# Patient Record
Sex: FEMALE | Race: BLACK OR AFRICAN AMERICAN | Employment: UNEMPLOYED | URBAN - METROPOLITAN AREA
[De-identification: names, ages, dates, MRNs, and addresses within clinical notes are randomized per-mention and may not be internally consistent; named-entity substitution may affect disease eponyms.]

---

## 2018-05-18 ENCOUNTER — TELEPHONE (OUTPATIENT)
Dept: FAMILY MEDICINE CLINIC | Age: 43
End: 2018-05-18

## 2018-05-18 ENCOUNTER — OFFICE VISIT (OUTPATIENT)
Dept: FAMILY MEDICINE CLINIC | Age: 43
End: 2018-05-18

## 2018-05-18 ENCOUNTER — HOSPITAL ENCOUNTER (OUTPATIENT)
Dept: LAB | Age: 43
Discharge: HOME | End: 2018-05-18

## 2018-05-18 VITALS
BODY MASS INDEX: 39.75 KG/M2 | SYSTOLIC BLOOD PRESSURE: 126 MMHG | TEMPERATURE: 98 F | HEART RATE: 78 BPM | OXYGEN SATURATION: 98 % | RESPIRATION RATE: 18 BRPM | HEIGHT: 62 IN | DIASTOLIC BLOOD PRESSURE: 75 MMHG | WEIGHT: 216 LBS

## 2018-05-18 DIAGNOSIS — F33.2 MDD (MAJOR DEPRESSIVE DISORDER), RECURRENT SEVERE, WITHOUT PSYCHOSIS (HCC): ICD-10-CM

## 2018-05-18 DIAGNOSIS — R06.83 SNORING: ICD-10-CM

## 2018-05-18 DIAGNOSIS — Z01.419 WELL WOMAN EXAM: ICD-10-CM

## 2018-05-18 DIAGNOSIS — I10 ESSENTIAL HYPERTENSION: ICD-10-CM

## 2018-05-18 DIAGNOSIS — R53.83 FATIGUE, UNSPECIFIED TYPE: ICD-10-CM

## 2018-05-18 DIAGNOSIS — E66.9 OBESITY (BMI 30.0-34.9): ICD-10-CM

## 2018-05-18 DIAGNOSIS — M94.0 COSTOCHONDRITIS: ICD-10-CM

## 2018-05-18 DIAGNOSIS — I10 ESSENTIAL HYPERTENSION: Primary | ICD-10-CM

## 2018-05-18 DIAGNOSIS — D64.9 ANEMIA, UNSPECIFIED TYPE: ICD-10-CM

## 2018-05-18 PROCEDURE — 99001 SPECIMEN HANDLING PT-LAB: CPT | Performed by: INTERNAL MEDICINE

## 2018-05-18 RX ORDER — BUPROPION HYDROCHLORIDE 100 MG/1
100 TABLET, EXTENDED RELEASE ORAL 2 TIMES DAILY
Qty: 60 TAB | Refills: 1 | Status: SHIPPED | OUTPATIENT
Start: 2018-05-18 | End: 2018-07-17

## 2018-05-18 RX ORDER — BUPROPION HYDROCHLORIDE 100 MG/1
100 TABLET, EXTENDED RELEASE ORAL 2 TIMES DAILY
Qty: 60 TAB | Refills: 1 | Status: SHIPPED | OUTPATIENT
Start: 2018-05-18 | End: 2018-05-18 | Stop reason: SDUPTHER

## 2018-05-18 RX ORDER — AMLODIPINE BESYLATE 5 MG/1
5 TABLET ORAL DAILY
Qty: 30 TAB | Refills: 5 | Status: SHIPPED | OUTPATIENT
Start: 2018-05-18 | End: 2018-05-18 | Stop reason: SDUPTHER

## 2018-05-18 RX ORDER — IBUPROFEN 600 MG/1
600 TABLET ORAL
Qty: 90 TAB | Refills: 1 | Status: SHIPPED | OUTPATIENT
Start: 2018-05-18 | End: 2018-07-17

## 2018-05-18 RX ORDER — AMLODIPINE BESYLATE 5 MG/1
5 TABLET ORAL DAILY
Qty: 30 TAB | Refills: 5 | Status: SHIPPED | OUTPATIENT
Start: 2018-05-18 | End: 2018-11-14

## 2018-05-18 RX ORDER — IBUPROFEN 600 MG/1
600 TABLET ORAL
Qty: 90 TAB | Refills: 1 | Status: SHIPPED | OUTPATIENT
Start: 2018-05-18 | End: 2018-05-18 | Stop reason: SDUPTHER

## 2018-05-18 NOTE — MR AVS SNAPSHOT
[address] [place] [address] [address] [address] 
[phone] Patient: Angella Delgado MRN: [id number] :1975 Visit Information Date & Time Provider Department Dept. Phone Encounter #  
 2018  2:00 PM Kaity Hanley MD [address] [phone] [id number] Follow-up Instructions Return in about 6 weeks (around 2018) for depression, weight loss. Upcoming Health Maintenance Date Due Pneumococcal 19-64 Medium Risk (1 of 1 - PPSV23) 10/31/1996 DTaP/Tdap/Td series (1 - Tdap) 10/31/1998 Influenza Age 5 to Adult 2018 PAP AKA CERVICAL CYTOLOGY 2020 Allergies as of 2018  Review Complete On: 2018 By: Kaity Hanley MD  
 No Known Allergies Current Immunizations  Never Reviewed No immunizations on file. Not reviewed this visit You Were Diagnosed With   
  
 Codes Comments Essential hypertension    -  Primary ICD-10-CM: I10 
ICD-9-CM: 401.9 Obesity (BMI 30.0-34.9)     ICD-10-CM: B98.7 ICD-9-CM: 278.00   
 MDD (major depressive disorder), recurrent severe, without psychosis [facility]     ICD-10-CM: F33.2 ICD-9-CM: 296.33 Costochondritis     ICD-10-CM: M94.0 ICD-9-CM: 733.6 Anemia, unspecified type     ICD-10-CM: D64.9 ICD-9-CM: 285.9 Snoring     ICD-10-CM: R06.83 
ICD-9-CM: 786.09 Fatigue, unspecified type     ICD-10-CM: R53.83 ICD-9-CM: 780.79 Well woman exam     ICD-10-CM: B14.775 ICD-9-CM: V72.31 Vitals BP Pulse Temp Resp Height(growth percentile) Weight(growth percentile) 126/75 78 98 °F (36.7 °C) 18 5' 2\" (1.575 m) 216 lb (98 kg) SpO2 BMI OB Status Smoking Status 98% 39.51 kg/m2 Having regular periods Current Every Day Smoker BMI and BSA Data Body Mass Index Body Surface Area  
 39.51 kg/m 2 2.07 m 2 Preferred Pharmacy Pharmacy Name Phone [facility] [address] [facility], [address] [place] [address] [phone] Your Updated Medication List  
  
   
This list is accurate as of 5/18/18  2:45 PM.  Always use your most recent med list. amLODIPine 5 mg tablet Commonly known as:  Philippe Lute Take 1 Tab by mouth daily for 180 days. buPROPion  mg SR tablet Commonly known as:  Bunny Thang Take 1 Tab by mouth two (2) times a day for 60 days. ibuprofen 600 mg tablet Commonly known as:  MOTRIN Take 1 Tab by mouth every eight (8) hours as needed for Pain for up to 60 days. Prescriptions Sent to Pharmacy Refills  
 amLODIPine (NORVASC) 5 mg tablet 5 Sig: Take 1 Tab by mouth daily for 180 days. Class: Normal  
 Pharmacy: [facility] [address] [facility], [address] [place] [address] Ph #: [phone] Route: Oral  
 ibuprofen (MOTRIN) 600 mg tablet 1 Sig: Take 1 Tab by mouth every eight (8) hours as needed for Pain for up to 60 days. Class: Normal  
 Pharmacy: [facility] [address], [address] [place] [address] Ph #: [phone] Route: Oral  
 buPROPion SR (WELLBUTRIN SR) 100 mg SR tablet 1 Sig: Take 1 Tab by mouth two (2) times a day for 60 days. Class: Normal  
 Pharmacy: [facility] [address], [address] [place] [address] Ph #: [phone] Route: Oral  
  
We Performed the Following REFERRAL TO GYNECOLOGY [REF30 Custom] Comments:  
 Please evaluate patient for pap, well woman exam 
 
[facility]. SLEEP MEDICINE REFERRAL [GFC624 Custom] Comments:  
 Please evaluate patient for sleep apnea with sleep testing Aternity. Follow-up Instructions Return in about 6 weeks (around 6/29/2018) for depression, weight loss. To-Do List   
 05/18/2018 Lab:  CBC WITH AUTOMATED DIFF   
  
 05/18/2018 Lab:  FERRITIN   
  
 05/18/2018 Lab:  IRON PROFILE   
  
 05/18/2018 Lab:  METABOLIC PANEL, COMPREHENSIVE   
  
 05/18/2018 Lab:  T4, FREE   
  
 05/18/2018 Lab:  TSH 3RD GENERATION Referral Information Referral ID Referred By Referred To  
  
 [id number] Brigitte Cheeks Not Available Visits Status Start Date End Date 1 New Request 5/18/18 5/18/19 If your referral has a status of pending review or denied, additional information will be sent to support the outcome of this decision. Referral ID Referred By Referred To  
 [id number] Brigitte Cheeks Not Available Visits Status Start Date End Date 1 New Request 5/18/18 5/18/19 If your referral has a status of pending review or denied, additional information will be sent to support the outcome of this decision. Patient Instructions We are sending a referral to gynecology for well woman exam/pap smear and sleep medicine for evaluation of sleep apnea . You should be called about this in 3-4 weeks. If no word in 4 weeks please give us a call to follow up For Chest Pain: 
Take ibuprofen 600 mg with food 3x per day for 2 weeks then take as needed For Depression/Anxiety: 
Take wellbutrin 100 mg twice a day, last dose at 2 PM 
If increased anxiety or trouble eating call clinic to discuss For Weight Loss: 
Eat low carbohydrate diet outlined below Work on walking 30 minutes a day 4-5 days a week Costochondritis: Care Instructions Your Care Instructions You have chest pain because the cartilage of your rib cage is inflamed. This problem is called costochondritis. This type of chest wall pain may last from days to weeks. It is not a heart problem. Sometimes costochondritis occurs with a cold or the flu, and other times the exact cause is not known. Follow-up care is a key part of your treatment and safety.  Be sure to make and go to all appointments, and call your doctor if you are having problems. It's also a good idea to know your test results and keep a list of the medicines you take. How can you care for yourself at home? · Take medicines for pain and inflammation exactly as directed. ¨ If the doctor gave you a prescription medicine, take it as prescribed. ¨ If you are not taking a prescription pain medicine, ask your doctor if you can take an over-the-counter medicine. ¨ Do not take two or more pain medicines at the same time unless the doctor told you to. Many pain medicines have acetaminophen, which is Tylenol. Too much acetaminophen (Tylenol) can be harmful. · It may help to use a warm compress or heating pad (set on low) on your chest. You can also try alternating heat and ice. Put ice or a cold pack on the area for 10 to 20 minutes at a time. Put a thin cloth between the ice and your skin. · Avoid any activity that strains the chest area. As your pain gets better, you can slowly return to your normal activities. · Do not use tape, an elastic bandage, a \"rib belt,\" or anything else that restricts your chest wall motion. When should you call for help? Call 911 anytime you think you may need emergency care. For example, call if: 
? · You have new or different chest pain or pressure. This may occur with: ¨ Sweating. ¨ Shortness of breath. ¨ Nausea or vomiting. ¨ Pain that spreads from the chest to the neck, jaw, or one or both shoulders or arms. ¨ Dizziness or lightheadedness. ¨ A fast or uneven pulse. After calling 911, chew 1 adult-strength aspirin. Wait for an ambulance. Do not try to drive yourself. ? · You have severe trouble breathing. ?Call your doctor now or seek immediate medical care if: 
? · You have a fever or cough. ? · You have any trouble breathing. ? · Your chest pain gets worse. ? Watch closely for changes in your health, and be sure to contact your doctor if: ? · Your chest pain continues even though you are taking anti-inflammatory medicine. ? · Your chest wall pain has not improved after 5 to 7 days. Where can you learn more? Go to http://opal-parker.info/. Enter G722 in the search box to learn more about \"Costochondritis: Care Instructions. \" Current as of: March 20, 2017 Content Version: 11.4 © 0224-6907 [employer]. Care instructions adapted under license by [employer] (which disclaims liability or warranty for this information). If you have questions about a medical condition or this instruction, always ask your healthcare professional. [place] [address] any warranty or liability for your use of this information. Learning About Low-Carbohydrate Diets for Weight Loss What is a low-carbohydrate diet? Low-carb diets avoid foods that are high in carbohydrate. These high-carb foods include pasta, bread, rice, cereal, fruits, and starchy vegetables. Instead, these diets usually have you eat foods that are high in fat and protein. Many people lose weight quickly on a low-carb diet. But the early weight loss is water. People on this diet often gain the weight back after they start eating carbs again. Not all diet plans are safe or work well. A lot of the evidence shows that low-carb diets aren't healthy. That's because these diets often don't include healthy foods like fruits and vegetables. Losing weight safely means balancing protein, fat, and carbs with every meal and snack. And low-carb diets don't always provide the vitamins, minerals, and fiber you need. If you have a serious medical condition, talk to your doctor before you try any diet. These conditions include kidney disease, heart disease, type 2 diabetes, high cholesterol, and high blood pressure. If you are pregnant, it may not be safe for your baby if you are on a low-carb diet. How can you lose weight safely? You might have heard that a diet plan helped another person lose weight. But that doesn't mean that it will work for you. It is very hard to stay on a diet that includes lots of big changes in your eating habits. If you want to get to a healthy weight and stay there, making healthy lifestyle changes will often work better than dieting. These steps can help. · Make a plan for change. Work with your doctor to create a plan that is right for you. · See a dietitian. He or she can show you how to make healthy changes in your eating habits. · Manage stress. If you have a lot of stress in your life, it can be hard to focus on making healthy changes to your daily habits. · Track your food and activity. You are likely to do better at losing weight if you keep track of what you eat and what you do. Follow-up care is a key part of your treatment and safety. Be sure to make and go to all appointments, and call your doctor if you are having problems. It's also a good idea to know your test results and keep a list of the medicines you take. Where can you learn more? Go to http://opal-parker.info/. Enter A121 in the search box to learn more about \"Learning About Low-Carbohydrate Diets for Weight Loss. \" Current as of: May 12, 2017 Content Version: 11.4 © 4591-9059 [employer]. Care instructions adapted under license by [employer] (which disclaims liability or warranty for this information). If you have questions about a medical condition or this instruction, always ask your healthcare professional. [place] [address] any warranty or liability for your use of this information. Bupropion (By mouth) Bupropion ([id number]) Treats depression and aids in quitting smoking. Also prevents depression caused by seasonal affective disorder (SAD). Brand Name(s): Aplenzin, Forfivo XL, Wellbutrin, Wellbutrin SR, Wellbutrin XL, Zyban There may be other brand names for this medicine. When This Medicine Should Not Be Used: This medicine is not right for everyone. Do not use it if you had an allergic reaction to bupropion, or if you have seizures, anorexia, or bulimia. How to Use This Medicine:  
Tablet, Long Acting Tablet · Take your medicine as directed. Your dose may need to be changed several times to find what works best for you. · You may need to take Wellbutrin® for up to 4 weeks before you feel better. You may need to take Zyban® for 1 to 2 weeks before the date that you plan to stop smoking. · Swallow the extended-release tablet whole. Do not crush, break, or chew it. · It is best to take Aplenzin® in the morning. · Do not take Wellbutrin® or Zyban® close to bedtime if you have trouble sleeping. · Take it with food if it upsets your stomach or if you have nausea. · If you take the extended-release tablet, part of the tablet may pass into your stools. This is normal and is nothing to worry about. · This medicine should come with a Medication Guide. Ask your pharmacist for a copy if you do not have one. · Missed dose: Skip the missed dose and go back to your regular dosing schedule. Never take extra medicine to make up for a missed dose. · Store the medicine in a closed container at room temperature, away from heat, moisture, and direct light. Drugs and Foods to Avoid: Ask your doctor or pharmacist before using any other medicine, including over-the-counter medicines, vitamins, and herbal products. · Do not use this medicine and an MAO inhibitor (MAOI) within 14 days of each other. Do not use Zyban® to quit smoking if you already take Aplenzin® or Wellbutrin® for depression, because they are the same medicine. · Tell your doctor if you take barbiturates, benzodiazepines, antiseizure medicine, or sedatives, or if you recently stopped taking them. · Some medicines can affect how bupropion works.  Tell your doctor if you use any of the following: ¨ Amantadine, carbamazepine, cimetidine, clopidogrel, cyclophosphamide, digoxin, efavirenz, levodopa, lopinavir, nelfinavir, nicotine patch, orphenadrine, phenobarbital, phenytoin, ritonavir, tamoxifen, theophylline, thiotepa, ticlopidine ¨ Beta blocker medicine (including metoprolol) ¨ A blood thinner (including warfarin) ¨ Insulin or diabetes medicine ¨ Medicine to treat depression (including desipramine, fluoxetine, imipramine, nortriptyline, paroxetine, sertraline, venlafaxine) ¨ Medicine to treat heart rhythm problems (including flecainide, propafenone) ¨ Medicine to treat mental illness (including haloperidol, risperidone, thioridazine) ¨ Steroid medicine (including dexamethasone, hydrocortisone, methylprednisolone, prednisolone, prednisone) · Do not drink alcohol while you are using this medicine. · Tell your doctor if you use anything else that makes you sleepy. Some examples are allergy medicine, narcotic pain medicine, and alcohol. Warnings While Using This Medicine: · Tell your doctor if you are pregnant or breastfeeding, or if you have kidney disease, liver disease, heart disease, diabetes, glaucoma, mental illness (including bipolar disorder), or high blood pressure. Tell your doctor if you have a history of drug addiction or if you drink alcohol. · For some children, teenagers, and young adults, this medicine may increase mental or emotional problems. This may lead to thoughts of suicide and violence. Talk with your doctor right away if you have any thoughts or behavior changes that concern you. Tell your doctor if you or anyone in your family has a history of bipolar disorder or suicide attempts. · This medicine may cause the following problems: 
¨ Increased risk of seizures ¨ Changes in mood or behavior ¨ High blood pressure ¨ Serious skin reactions · This medicine may make you dizzy or drowsy.  Do not drive or do anything that could be dangerous until you know how this medicine affects you. · Zyban® is only part of a complete program to help you quit smoking. You may still want to smoke at times. Have a plan to cope with these situations. · Do not stop using this medicine suddenly. Your doctor will need to slowly decrease your dose before you stop it completely. · Tell any doctor or dentist who treats you that you are using this medicine. This medicine may affect certain medical test results. · Your doctor will check your progress and the effects of this medicine at regular visits. Keep all appointments. · Keep all medicine out of the reach of children. Never share your medicine with anyone. Possible Side Effects While Using This Medicine:  
Call your doctor right away if you notice any of these side effects: · Allergic reaction: Itching or hives, swelling in your face or hands, swelling or tingling in your mouth or throat, chest tightness, trouble breathing · Blistering, peeling, red skin rash · Chest pain, trouble breathing, fast, slow, or uneven heartbeat · Eye pain, vision changes, seeing halos around lights · Muscle or joint pain, fever with rash · Seeing or hearing things that are not there, feeling like people are against you · Seizures · Sudden increase in energy, racing thoughts, trouble sleeping · Thoughts of hurting yourself, worsening depression, severe agitation or confusion If you notice these less serious side effects, talk with your doctor: · Dry mouth 
· Headache, dizziness · Nausea, vomiting, constipation, diarrhea, gas, stomach pain · Weight gain or loss If you notice other side effects that you think are caused by this medicine, tell your doctor. Call your doctor for medical advice about side effects. You may report side effects to FDA at 0-116-FDA-7213 © 2017 [address] Information is for End User's use only and may not be sold, redistributed or otherwise used for commercial purposes. The above information is an  only. It is not intended as medical advice for individual conditions or treatments. Talk to your doctor, nurse or pharmacist before following any medical regimen to see if it is safe and effective for you. Introducing [facility] SERVICES! Ciara Guzman introduces BeneStream patient portal. Now you can access parts of your medical record, email your doctor's office, and request medication refills online. 1. In your internet browser, go to [email or web address] 2. Click on the First Time User? Click Here link in the Sign In box. You will see the New Member Sign Up page. 3. Enter your BeneStream Access Code exactly as it appears below. You will not need to use this code after youve completed the sign-up process. If you do not sign up before the expiration date, you must request a new code. · BeneStream Access Code: [email or web address] Expires: 7/9/2018  8:05 PM 
 
4. Enter the last four digits of your Social Security Number (xxxx) and Date of Birth (mm/dd/yyyy) as indicated and click Submit. You will be taken to the next sign-up page. 5. Create a BeneStream ID. This will be your BeneStream login ID and cannot be changed, so think of one that is secure and easy to remember. 6. Create a BeneStream password. You can change your password at any time. 7. Enter your Password Reset Question and Answer. This can be used at a later time if you forget your password. 8. Enter your e-mail address. You will receive e-mail notification when new information is available in [address] 9. Click Sign Up. You can now view and download portions of your medical record. 10. Click the Download Summary menu link to download a portable copy of your medical information.  
 
If you have questions, please visit the Frequently Asked Questions section of the [employer]. Remember, [email or web address] is NOT to be used for urgent needs. For medical emergencies, dial 911. Now available from your iPhone and Android! Please provide this summary of care documentation to your next provider. Your primary care clinician is listed as NONE. If you have any questions after today's visit, please call [phone].

## 2018-05-18 NOTE — PROGRESS NOTES
INTERNAL MEDICINE INITIAL PROVIDER VISIT    SUBJECTIVE:     Chief Complaint   Patient presents with    New Patient    Hypertension       HPI: 38 y.o. female with PMHx significant for HTN and obesity is here for the above chief complaint(s). Here to establish care. Chest pain: onset 2 months ago while resting. Sharp throbbing pain, sometimes goes away. Nothing makes better. Taking deep breaths to help with pain but not helping. No associated shortness of breath. No pleuritic chest pain. No associated symptoms. Once felt dizzy and blurry vision last week and at same time had high blood pressure. Seen for this pain 4/10/2018 and r/o for ischemia of heart via negative troponins x 3 and negative cardiac stress test. Feels heart racing. Hypertension: Today BP is controlled. Taking amlodipine 5, started 4/11/2018. No side effects from medications. Medication fair compliance did not take for 2 days and then took this AM. Denies headache, lightheadedness, dizziness, vision changes, irregular heart rate, shortness of breath, cough, abdominal pain. Some right leg tightness but not swelling. Insomnia: Some AM headaches, rare caffiene intake. STOP-BANG: + snoring, + day time fatigue, - observied apneic episodes, + HTN, - BMI >35, - AGE >50, - neck size >17 in male >15 in female, - male gender. 3/8 intermediate risk ASHER. Depression and Anxiety: Onset years ago, over time \"gradually gets worse. \" Feels worry is worse than sadness. No past treatment with medication or therapy. No h/o elevated energy with decreased need for sleep. No impulsivity. Stressors include kids, currently on probation. For past 2 weeks reports nearly daily worrying too much, irritable mood, feeling afraid, trouble sleeping, low energy, over eating, feeling down depressed and more than half days of anhedonia, feeling bad about self, fidgeting, trouble feeling nervous on edge and not being able to control worry.  No suicidal thoughts, no suicide attempts. No AVH/PD. Rare alcohol use and no drugs. ROS: 12 point ROS completed, positive per HPI    Current Outpatient Prescriptions   Medication Sig    amLODIPine (NORVASC) 5 mg tablet Take 1 Tab by mouth daily for 180 days.  buPROPion SR (WELLBUTRIN SR) 100 mg SR tablet Take 1 Tab by mouth two (2) times a day for 60 days.  ibuprofen (MOTRIN) 600 mg tablet Take 1 Tab by mouth every eight (8) hours as needed for Pain for up to 60 days. No current facility-administered medications for this visit. Health Maintenance   Topic Date Due    Pneumococcal 19-64 Medium Risk (1 of 1 - PPSV23) 10/31/1996    DTaP/Tdap/Td series (1 - Tdap) 10/31/1998    Influenza Age 5 to Adult  08/01/2018    PAP AKA CERVICAL CYTOLOGY  01/01/2020       Medications and Allergies: Reviewed and confirmed in the chart    Past Medical Hx: Reviewed and confirmed in the chart  Patient Active Problem List   Diagnosis Code    Obesity (BMI 30.0-34. 9) E66.9    Hypertension I10    Costochondritis M94.0    Anemia D64.9    Fatigue R53.83    Snoring R06.83       Family Hx, Surgical Hx, Social Hx: Reviewed and updated in EMR    OBJECTIVE:  Vitals:    05/18/18 1417   BP: 126/75   Pulse: 78   Resp: 18   Temp: 98 °F (36.7 °C)   SpO2: 98%   Weight: 216 lb (98 kg)   Height: 5' 2\" (1.575 m)       BP Readings from Last 3 Encounters:   05/18/18 126/75   04/11/18 (!) 143/104   12/02/17 (!) 158/119     Wt Readings from Last 3 Encounters:   05/18/18 216 lb (98 kg)   04/10/18 190 lb (86.2 kg)   12/02/17 191 lb (86.6 kg)       General: Pleasant adult woman in no acute distress  HEENT: Head is atraumatic normo-cephalic. Neck: Supple, no LAD, no palpable thyromegaly. CVS: Heart regular, rate, and rhythm. Audible S1 and S2. No murmurs, rubs or gallops. Chest: TTP anteior chest wall, reproducible chest pain  PULM: Lungs clear to auscultation bilaterally. No wheezes, rales or rhonchi. EXT: 2+ dorsalis pedis pulses bilaterally.  No pedal edema bilaterally  Neuro: Alert and oriented x3. MSE: mood euthymic with underlying dysphoria, affect congruent and reactive, No SI/HI. PHQ-9: 19, somewhat difficult  FRANKO-7: 15, somewhat difficult    RESULTS:  4/10/2018  9:30 PM - Uri[place], [facility]   [employer] Value Flag Ref Range Units Status   Sodium 139  136 - 145 mEq/L Final   Potassium 3.5  3.5 - 5.1 mEq/L Final   Chloride 107  98 - 107 mEq/L Final   CO2 24  21 - 32 mEq/L Final   Glucose 87  74 - 106 mg/dl Final   BUN 11  7 - 25 mg/dl Final   Creatinine 0.9  0.6 - 1.3 mg/dl Final   GFR est AA >60.0     Final   Comment:   GFR est non-AA >60     Final   Calcium 8.9  8.5 - 10.1 mg/dl Final     4/10/2018  9:04 PM - Uriah, [facility]   [employer] Value Flag Ref Range Units Status   WBC 8.5  4.0 - 11.0 1000/mm3 Final   RBC 5.59 (H) 3.60 - 5.20 M/uL Final   HGB 12.2 (L) 13.0 - 17.2 gm/dl Final   HCT 38.6  37.0 - 50.0 % Final   MCV 69.1 (L) 80.0 - 98.0 fL Final   MCH 21.8 (L) 25.4 - 34.6 pg Final   MCHC 31.6  30.0 - 36.0 gm/dl Final   PLATELET 563  390 - 901 1000/mm3 Final   MPV 9.4  6.0 - 10.0 fL Final   RDW-SD 39.8  36.4 - 46.3   Final   NRBC 0  0 - 0   Final   IMMATURE GRANULOCYTES 0.2  0.0 - 3.0 % Final         Echo Results  (Last 48 hours)                    18 1003   ECHO STRESS Final result     Narrative:   ==================== XCELERA REPORT ========================================   [facility] ID: [id number]                                                     [facility]                                                     [address] [place] [address].  [place], [address] [place] [address], [address]                                Stress Echocardiogram Report        Name: [facility] Date: 0     2018 10:02 AM   MRN: [id number]                  Patient Location: [id number]^[id number]   : 1975              Age: 38 yrs   Height: 62 in                Weight: 190 lb                       BSA: 1.9 m2   BP: 152/97 mmHg              HR: 81   Gender: Female               Account #: [de-identified]   Reason For Study: Chest Pain   History: Obese, HTN   Ordering Physician: Sharad Carter   Performed By: Yaakov Hidalgo, [facility]        Interpretation Summary   The Electrocardiographic Interpretation: negative by ECG criteria. The Echocardiographic Interpretation: hyperkinetic wall motion. The OverAll Impression : negative for ischemia with low likelihood for CAD.      _____________________________________________________________________________   Colonel Couch   Interpretation Summary        Stress Results              Protocol:  Simone              Target HR: 153 bpm        Maximum Predicted HR: 180 bpm                            Stress Duration:  6:21 mm:ss *                      Maximum Stress HR:  169 bpm *             Stress Comments   A treadmill exercise test according to Simone protocol was performed. The   baseline ECG displays normal sinus rhythm. Arrhythmia induced during stress:   occasional PAC's. There no ST segment changes during stress. Test was   terminated due to target heart rate was achieved. Patient developed no   symptoms. Normal blood pressure response.        I      WMSI = 1.00     % Normal = 100                                                                   REST               Nursing Notes Reviewed    ASSESSMENT AND PLAN    ICD-10-CM ICD-9-CM    1. Essential hypertension I10 401.9 TSH 3RD GENERATION      METABOLIC PANEL, COMPREHENSIVE      T4, FREE      SLEEP MEDICINE REFERRAL      TSH 3RD GENERATION      METABOLIC PANEL, COMPREHENSIVE      T4, FREE       amLODIPine (NORVASC) 5 mg tablet   2. Obesity (BMI 30.0-34. 9) E66.9 278.00 SLEEP MEDICINE REFERRAL  Diet and exercise discussed   3.  MDD (major depressive disorder), recurrent severe, without psychosis ([facility] [address]): low risk SI, see HPI assessment F33.2 296.33 TSH 3RD GENERATION      T4, FREE      SLEEP MEDICINE REFERRAL      TSH 3RD GENERATION      T4, FREE       buPROPion SR (WELLBUTRIN SR) 100 mg SR tablet BID discussed r/b/se of med proposed   4. Costochondritis M94.0 733.6  ibuprofen (MOTRIN) 600 mg tablet   5. Anemia, unspecified type D64.9 285.9 CBC WITH AUTOMATED DIFF      IRON PROFILE      FERRITIN      CBC WITH AUTOMATED DIFF      IRON PROFILE      FERRITIN   6. Snoring R06.83 786.09 SLEEP MEDICINE REFERRAL   7. Fatigue, unspecified type R53.83 780.79 SLEEP MEDICINE REFERRAL   8. Well woman exam Z01.419 V72.31 REFERRAL TO GYNECOLOGY       Orders Placed This Encounter    TSH 3RD GENERATION    METABOLIC PANEL, COMPREHENSIVE    CBC WITH AUTOMATED DIFF    IRON PROFILE    FERRITIN    T4, FREE    SLEEP MEDICINE REFERRAL    REFERRAL TO GYNECOLOGY    DISCONTD: amLODIPine (NORVASC) 5 mg tablet    DISCONTD: ibuprofen (MOTRIN) 600 mg tablet    DISCONTD: buPROPion SR (WELLBUTRIN SR) 100 mg SR tablet       Future Appointments  Date Time Provider Syl Johnsoni   6/29/2018 10:00 AM Osmin Fournier MD [address] [place] [address] printed and provided to patient    Assessment and plan above discussed with patient, patient voiced understanding and agreement with plan. More than 50% of this 45 min visit was spent face to face counseling the patient about the etiology and treatment options for the above health conditions outlined in assessment and plan       Osmin Fournier M.D.   [place] [phone]  [place] [phone]

## 2018-05-18 NOTE — PROGRESS NOTES
Chief Complaint   Patient presents with    New Patient    Hypertension     1. Have you been to the ER, urgent care clinic since your last visit? Hospitalized since your last visit? [facility]- HTN    2. Have you seen or consulted any other health care providers outside of the [address] [place] [address] since your last visit? Include any pap smears or colon screening.  No

## 2018-05-18 NOTE — TELEPHONE ENCOUNTER
Pt states currently at pharmacy [facility]. Pharmacy has been updated.  Contacted nurse Raquel Rodriguez and stated will resend medication to correct pharmacy

## 2018-05-18 NOTE — PATIENT INSTRUCTIONS
We are sending a referral to gynecology for well woman exam/pap smear and sleep medicine for evaluation of sleep apnea . You should be called about this in 3-4 weeks. If no word in 4 weeks please give us a call to follow up    For Chest Pain:  Take ibuprofen 600 mg with food 3x per day for 2 weeks then take as needed    For Depression/Anxiety:  Take wellbutrin 100 mg twice a day, last dose at 2 PM  If increased anxiety or trouble eating call clinic to discuss    For Weight Loss:  Eat low carbohydrate diet outlined below  Work on walking 30 minutes a day 4-5 days a week            Costochondritis: Care Instructions  Your Care Instructions  You have chest pain because the cartilage of your rib cage is inflamed. This problem is called costochondritis. This type of chest wall pain may last from days to weeks. It is not a heart problem. Sometimes costochondritis occurs with a cold or the flu, and other times the exact cause is not known. Follow-up care is a key part of your treatment and safety. Be sure to make and go to all appointments, and call your doctor if you are having problems. It's also a good idea to know your test results and keep a list of the medicines you take. How can you care for yourself at home? · Take medicines for pain and inflammation exactly as directed. ¨ If the doctor gave you a prescription medicine, take it as prescribed. ¨ If you are not taking a prescription pain medicine, ask your doctor if you can take an over-the-counter medicine. ¨ Do not take two or more pain medicines at the same time unless the doctor told you to. Many pain medicines have acetaminophen, which is Tylenol. Too much acetaminophen (Tylenol) can be harmful. · It may help to use a warm compress or heating pad (set on low) on your chest. You can also try alternating heat and ice. Put ice or a cold pack on the area for 10 to 20 minutes at a time. Put a thin cloth between the ice and your skin.   · Avoid any activity that strains the chest area. As your pain gets better, you can slowly return to your normal activities. · Do not use tape, an elastic bandage, a \"rib belt,\" or anything else that restricts your chest wall motion. When should you call for help? Call 911 anytime you think you may need emergency care. For example, call if:  ? · You have new or different chest pain or pressure. This may occur with:  ¨ Sweating. ¨ Shortness of breath. ¨ Nausea or vomiting. ¨ Pain that spreads from the chest to the neck, jaw, or one or both shoulders or arms. ¨ Dizziness or lightheadedness. ¨ A fast or uneven pulse. After calling 911, chew 1 adult-strength aspirin. Wait for an ambulance. Do not try to drive yourself. ? · You have severe trouble breathing. ?Call your doctor now or seek immediate medical care if:  ? · You have a fever or cough. ? · You have any trouble breathing. ? · Your chest pain gets worse. ? Watch closely for changes in your health, and be sure to contact your doctor if:  ? · Your chest pain continues even though you are taking anti-inflammatory medicine. ? · Your chest wall pain has not improved after 5 to 7 days. Where can you learn more? Go to http://opal-parker.info/. Enter S033 in the search box to learn more about \"Costochondritis: Care Instructions. \"  Current as of: March 20, 2017  Content Version: 11.4  © 9708-1468 [employer]. Care instructions adapted under license by [facility] (which disclaims liability or warranty for this information). If you have questions about a medical condition or this instruction, always ask your healthcare professional. [place] [address] any warranty or liability for your use of this information. Learning About Low-Carbohydrate Diets for Weight Loss  What is a low-carbohydrate diet? Low-carb diets avoid foods that are high in carbohydrate.  These high-carb foods include pasta, bread, rice, cereal, fruits, and starchy vegetables. Instead, these diets usually have you eat foods that are high in fat and protein. Many people lose weight quickly on a low-carb diet. But the early weight loss is water. People on this diet often gain the weight back after they start eating carbs again. Not all diet plans are safe or work well. A lot of the evidence shows that low-carb diets aren't healthy. That's because these diets often don't include healthy foods like fruits and vegetables. Losing weight safely means balancing protein, fat, and carbs with every meal and snack. And low-carb diets don't always provide the vitamins, minerals, and fiber you need. If you have a serious medical condition, talk to your doctor before you try any diet. These conditions include kidney disease, heart disease, type 2 diabetes, high cholesterol, and high blood pressure. If you are pregnant, it may not be safe for your baby if you are on a low-carb diet. How can you lose weight safely? You might have heard that a diet plan helped another person lose weight. But that doesn't mean that it will work for you. It is very hard to stay on a diet that includes lots of big changes in your eating habits. If you want to get to a healthy weight and stay there, making healthy lifestyle changes will often work better than dieting. These steps can help. · Make a plan for change. Work with your doctor to create a plan that is right for you. · See a dietitian. He or she can show you how to make healthy changes in your eating habits. · Manage stress. If you have a lot of stress in your life, it can be hard to focus on making healthy changes to your daily habits. · Track your food and activity. You are likely to do better at losing weight if you keep track of what you eat and what you do. Follow-up care is a key part of your treatment and safety. Be sure to make and go to all appointments, and call your doctor if you are having problems.  It's also a good idea to know your test results and keep a list of the medicines you take. Where can you learn more? Go to http://opal-parker.info/. Enter A121 in the search box to learn more about \"Learning About Low-Carbohydrate Diets for Weight Loss. \"  Current as of: May 12, 2017  Content Version: 11.4  © 7082-8628 [employer]. Care instructions adapted under license by [employer] (which disclaims liability or warranty for this information). If you have questions about a medical condition or this instruction, always ask your healthcare professional. [address] any warranty or liability for your use of this information. Bupropion (By mouth)   Bupropion ([id number]-on)  Treats depression and aids in quitting smoking. Also prevents depression caused by seasonal affective disorder (SAD). Brand Name(s): Aplenzin, Forfivo XL, Wellbutrin, Wellbutrin SR, Wellbutrin XL, Zyban   There may be other brand names for this medicine. When This Medicine Should Not Be Used: This medicine is not right for everyone. Do not use it if you had an allergic reaction to bupropion, or if you have seizures, anorexia, or bulimia. How to Use This Medicine:   Tablet, Long Acting Tablet  · Take your medicine as directed. Your dose may need to be changed several times to find what works best for you. · You may need to take Wellbutrin® for up to 4 weeks before you feel better. You may need to take Zyban® for 1 to 2 weeks before the date that you plan to stop smoking. · Swallow the extended-release tablet whole. Do not crush, break, or chew it. · It is best to take Aplenzin® in the morning. · Do not take Wellbutrin® or Zyban® close to bedtime if you have trouble sleeping. · Take it with food if it upsets your stomach or if you have nausea. · If you take the extended-release tablet, part of the tablet may pass into your stools.  This is normal and is nothing to worry about.  · This medicine should come with a Medication Guide. Ask your pharmacist for a copy if you do not have one. · Missed dose: Skip the missed dose and go back to your regular dosing schedule. Never take extra medicine to make up for a missed dose. · Store the medicine in a closed container at room temperature, away from heat, moisture, and direct light. Drugs and Foods to Avoid:   Ask your doctor or pharmacist before using any other medicine, including over-the-counter medicines, vitamins, and herbal products. · Do not use this medicine and an MAO inhibitor (MAOI) within 14 days of each other. Do not use Zyban® to quit smoking if you already take Aplenzin® or Wellbutrin® for depression, because they are the same medicine. · Tell your doctor if you take barbiturates, benzodiazepines, antiseizure medicine, or sedatives, or if you recently stopped taking them. · Some medicines can affect how bupropion works. Tell your doctor if you use any of the following:   ¨ Amantadine, carbamazepine, cimetidine, clopidogrel, cyclophosphamide, digoxin, efavirenz, levodopa, lopinavir, nelfinavir, nicotine patch, orphenadrine, phenobarbital, phenytoin, ritonavir, tamoxifen, theophylline, thiotepa, ticlopidine  ¨ Beta blocker medicine (including metoprolol)  ¨ A blood thinner (including warfarin)  ¨ Insulin or diabetes medicine  ¨ Medicine to treat depression (including desipramine, fluoxetine, imipramine, nortriptyline, paroxetine, sertraline, venlafaxine)  ¨ Medicine to treat heart rhythm problems (including flecainide, propafenone)  ¨ Medicine to treat mental illness (including haloperidol, risperidone, thioridazine)  ¨ Steroid medicine (including dexamethasone, hydrocortisone, methylprednisolone, prednisolone, prednisone)  · Do not drink alcohol while you are using this medicine. · Tell your doctor if you use anything else that makes you sleepy.  Some examples are allergy medicine, narcotic pain medicine, and alcohol. Warnings While Using This Medicine:   · Tell your doctor if you are pregnant or breastfeeding, or if you have kidney disease, liver disease, heart disease, diabetes, glaucoma, mental illness (including bipolar disorder), or high blood pressure. Tell your doctor if you have a history of drug addiction or if you drink alcohol. · For some children, teenagers, and young adults, this medicine may increase mental or emotional problems. This may lead to thoughts of suicide and violence. Talk with your doctor right away if you have any thoughts or behavior changes that concern you. Tell your doctor if you or anyone in your family has a history of bipolar disorder or suicide attempts. · This medicine may cause the following problems:  ¨ Increased risk of seizures  ¨ Changes in mood or behavior  ¨ High blood pressure  ¨ Serious skin reactions  · This medicine may make you dizzy or drowsy. Do not drive or do anything that could be dangerous until you know how this medicine affects you. · Zyban® is only part of a complete program to help you quit smoking. You may still want to smoke at times. Have a plan to cope with these situations. · Do not stop using this medicine suddenly. Your doctor will need to slowly decrease your dose before you stop it completely. · Tell any doctor or dentist who treats you that you are using this medicine. This medicine may affect certain medical test results. · Your doctor will check your progress and the effects of this medicine at regular visits. Keep all appointments. · Keep all medicine out of the reach of children. Never share your medicine with anyone.   Possible Side Effects While Using This Medicine:   Call your doctor right away if you notice any of these side effects:  · Allergic reaction: Itching or hives, swelling in your face or hands, swelling or tingling in your mouth or throat, chest tightness, trouble breathing  · Blistering, peeling, red skin rash  · Chest pain, trouble breathing, fast, slow, or uneven heartbeat  · Eye pain, vision changes, seeing halos around lights  · Muscle or joint pain, fever with rash  · Seeing or hearing things that are not there, feeling like people are against you  · Seizures  · Sudden increase in energy, racing thoughts, trouble sleeping  · Thoughts of hurting yourself, worsening depression, severe agitation or confusion  If you notice these less serious side effects, talk with your doctor:   · Dry mouth  · Headache, dizziness  · Nausea, vomiting, constipation, diarrhea, gas, stomach pain  · Weight gain or loss  If you notice other side effects that you think are caused by this medicine, tell your doctor. Call your doctor for medical advice about side effects. You may report side effects to FDA at 7-823-BLM-4570  © 2017 [facility] Information is for End User's use only and may not be sold, redistributed or otherwise used for commercial purposes. The above information is an  only. It is not intended as medical advice for individual conditions or treatments. Talk to your doctor, nurse or pharmacist before following any medical regimen to see if it is safe and effective for you.

## 2018-05-19 LAB
ALBUMIN SERPL-MCNC: 4.1 G/DL (ref 3.5–5.5)
ALBUMIN/GLOB SERPL: 1.1 {RATIO} (ref 1.2–2.2)
ALP SERPL-CCNC: 74 IU/L (ref 39–117)
ALT SERPL-CCNC: 27 IU/L (ref 0–32)
AST SERPL-CCNC: 23 IU/L (ref 0–40)
BASOPHILS # BLD AUTO: 0 X10E3/UL (ref 0–0.2)
BASOPHILS NFR BLD AUTO: 0 %
BILIRUB SERPL-MCNC: 0.3 MG/DL (ref 0–1.2)
BUN SERPL-MCNC: 11 MG/DL (ref 6–24)
BUN/CREAT SERPL: 12 (ref 9–23)
CALCIUM SERPL-MCNC: 9.3 MG/DL (ref 8.7–10.2)
CHLORIDE SERPL-SCNC: 101 MMOL/L (ref 96–106)
CO2 SERPL-SCNC: 23 MMOL/L (ref 18–29)
CREAT SERPL-MCNC: 0.91 MG/DL (ref 0.57–1)
EOSINOPHIL # BLD AUTO: 0.3 X10E3/UL (ref 0–0.4)
EOSINOPHIL NFR BLD AUTO: 3 %
ERYTHROCYTE [DISTWIDTH] IN BLOOD BY AUTOMATED COUNT: 16.6 % (ref 12.3–15.4)
FERRITIN SERPL-MCNC: 57 NG/ML (ref 15–150)
GFR SERPLBLD CREATININE-BSD FMLA CKD-EPI: 79 ML/MIN/1.73
GFR SERPLBLD CREATININE-BSD FMLA CKD-EPI: 91 ML/MIN/1.73
GLOBULIN SER CALC-MCNC: 3.6 G/DL (ref 1.5–4.5)
GLUCOSE SERPL-MCNC: 115 MG/DL (ref 65–99)
HCT VFR BLD AUTO: 41.4 % (ref 34–46.6)
HGB BLD-MCNC: 12.7 G/DL (ref 11.1–15.9)
IMM GRANULOCYTES # BLD: 0 X10E3/UL (ref 0–0.1)
IMM GRANULOCYTES NFR BLD: 0 %
IRON SATN MFR SERPL: 14 % (ref 15–55)
IRON SERPL-MCNC: 39 UG/DL (ref 27–159)
LYMPHOCYTES # BLD AUTO: 3.7 X10E3/UL (ref 0.7–3.1)
LYMPHOCYTES NFR BLD AUTO: 39 %
MCH RBC QN AUTO: 21.8 PG (ref 26.6–33)
MCHC RBC AUTO-ENTMCNC: 30.7 G/DL (ref 31.5–35.7)
MCV RBC AUTO: 71 FL (ref 79–97)
MONOCYTES # BLD AUTO: 0.8 X10E3/UL (ref 0.1–0.9)
MONOCYTES NFR BLD AUTO: 8 %
NEUTROPHILS # BLD AUTO: 4.7 X10E3/UL (ref 1.4–7)
NEUTROPHILS NFR BLD AUTO: 50 %
PLATELET # BLD AUTO: 386 X10E3/UL (ref 150–379)
POTASSIUM SERPL-SCNC: 3.7 MMOL/L (ref 3.5–5.2)
PROT SERPL-MCNC: 7.7 G/DL (ref 6–8.5)
RBC # BLD AUTO: 5.83 X10E6/UL (ref 3.77–5.28)
SODIUM SERPL-SCNC: 140 MMOL/L (ref 134–144)
T4 FREE SERPL-MCNC: 1.4 NG/DL (ref 0.82–1.77)
TIBC SERPL-MCNC: 277 UG/DL (ref 250–450)
TSH SERPL DL<=0.005 MIU/L-ACNC: 0.53 UIU/ML (ref 0.45–4.5)
UIBC SERPL-MCNC: 238 UG/DL (ref 131–425)
WBC # BLD AUTO: 9.5 X10E3/UL (ref 3.4–10.8)

## 2018-05-23 ENCOUNTER — TELEPHONE (OUTPATIENT)
Dept: FAMILY MEDICINE CLINIC | Age: 43
End: 2018-05-23

## 2018-05-23 DIAGNOSIS — Z13.220 SCREENING CHOLESTEROL LEVEL: ICD-10-CM

## 2018-05-23 DIAGNOSIS — E66.01 MORBID OBESITY (HCC): ICD-10-CM

## 2018-05-23 DIAGNOSIS — R73.9 ELEVATED SERUM GLUCOSE: Primary | ICD-10-CM

## 2018-06-11 ENCOUNTER — TELEPHONE (OUTPATIENT)
Dept: FAMILY MEDICINE CLINIC | Age: 43
End: 2018-06-11

## 2018-12-10 ENCOUNTER — TELEPHONE (OUTPATIENT)
Dept: FAMILY MEDICINE CLINIC | Age: 43
End: 2018-12-10

## 2022-03-18 PROBLEM — F33.2 MDD (MAJOR DEPRESSIVE DISORDER), RECURRENT SEVERE, WITHOUT PSYCHOSIS (HCC): Status: ACTIVE | Noted: 2018-05-18

## 2022-03-19 PROBLEM — R53.83 FATIGUE: Status: ACTIVE | Noted: 2018-05-18

## 2022-03-19 PROBLEM — R06.83 SNORING: Status: ACTIVE | Noted: 2018-05-18

## 2022-03-19 PROBLEM — D64.9 ANEMIA: Status: ACTIVE | Noted: 2018-05-18

## 2023-01-31 RX ORDER — FLUTICASONE PROPIONATE 50 MCG
2 SPRAY, SUSPENSION (ML) NASAL DAILY
COMMUNITY
Start: 2019-01-07